# Patient Record
Sex: FEMALE | Race: WHITE
[De-identification: names, ages, dates, MRNs, and addresses within clinical notes are randomized per-mention and may not be internally consistent; named-entity substitution may affect disease eponyms.]

---

## 2017-09-06 NOTE — PCM.POSTAN
POST ANESTHESIA ASSESSMENT





- MENTAL STATUS


Mental Status: Alert, Oriented





- RESPIRATORY


Respiratory Status: Respiratory Rate WNL, Airway Patent





- CARDIOVASCULAR


CV Status: Pulse Rate WNL, Blood Pressure Stable





- GASTROINTESTINAL


GI Status: No Symptoms





- POST OP HYDRATION


Hydration Status: Adequate & Stable

## 2017-09-06 NOTE — PCM.PREANE
Preanesthetic Assessment





- Anesthesia/Transfusion/Family Hx


Anesthesia History: Prior Anesthesia Reaction


Type of Anesthesia Reaction: Excessive Nausea/Vomiting


Other Type of Anesthesia Reaction Comment: states she is hard to wake and has 

problems with nausea post anesthesia


Family History of Anesthesia Reaction: No


Transfusion History: No Prior Transfusion(s)





- Review of Systems


General: No Symptoms


Pulmonary: No Symptoms


Cardiovascular: No Symptoms





- Physical Assessment


NPO Status Date: 09/05/17


O2 Sat by Pulse Oximetry: 100


Respiratory Rate: 16


Vital Signs: 





 Last Vital Signs











Temp  36.2 C   09/06/17 09:55


 


Pulse  60   09/06/17 09:55


 


Resp  16   09/06/17 09:55


 


BP  145/62 H  09/06/17 09:55


 


Pulse Ox  100   09/06/17 09:55











Height: 1.65 m


Weight: 74.389 kg


ASA Class: 2


Mental Status: Alert & Oriented x3


Airway Class: Mallampati = 2


Dentition: Reports: Normal Dentition


ROM/Head Extension: Full


Lungs: Clear to Auscultation, Normal Respiratory Effort


Cardiovascular: Regular Rate, Regular Rhythm





- Allergies


Allergies/Adverse Reactions: 


 Allergies











Allergy/AdvReac Type Severity Reaction Status Date / Time


 


acetaminophen [From Vicodin] Allergy  Agitation Verified 09/05/17 11:15


 


hydrocodone bitartrate Allergy  Agitation Verified 09/05/17 11:15





[From Vicodin]     


 


latex Allergy  welts Verified 09/05/17 11:15














- Anesthesia Plan


Pre-Op Medication Ordered: None





- Acknowledgements


Anesthesia Type Planned: MAC


Pt an Appropriate Candidate for the Planned Anesthesia: Yes


Alternatives and Risks of Anesthesia Discussed w Pt/Guardian: Yes


Pt/Guardian Understands and Agrees with Anesthesia Plan: Yes





PreAnesthesia Questionnaire


HEENT History: Reports: Other (See Below)


Other HEENT History: wears glasses/contacts


Respiratory History: Reports: Asthma


Gastrointestinal History: Reports: Other (See Below)


Other Gastrointestinal History: occasional heartburn


Genitourinary History: Reports: None


OB/GYN History: Reports: Pregnancy


Neurological History: Reports: Migraines


Endocrine/Metabolic History: Reports: Hypothyroidism


Other Immunologic History: connective tissue disorder


Oncologic (Cancer) History: Reports: Basal Cell Carcinoma





- Past Surgical History


Head Surgeries/Procedures: Reports: None


GI Surgical History: Reports: Cholecystectomy


Female  Surgical History: Reports: Hysterectomy


Musculoskeletal Surgical History: Reports: Shoulder Surgery


Other Musculoskeletal Surgeries/Procedures:: rotator cuff repair


Dermatological Surgical History: Reports: Skin Biopsy





- SUBSTANCE USE


Smoking Status *Q: Never Smoker


Second Hand Smoke Exposure: No


Recreational Drug Use History: No





- HOME MEDS


Home Medications: 


 Home Meds





Albuterol Sulfate [Albuterol Sulfate HFA] 1 - 2 puff INH ASDIRECTED PRN 06/27/

15 [History]


Estradiol [Vivelle-Dot] 0.0375 mg TRDERM ASDIRECTED 06/27/15 [History]


Hydroxychloroquine Sulfate [Plaquenil] 2 tab PO DAILY 06/27/15 [History]


traMADol [Ultram] 2 tab PO BEDTIME 06/27/15 [History]


Cyanocobalamin (Vitamin B-12) [Cyanocobalamin Injection] 1 injection IM WEEKLY 

09/05/17 [History]


Diclofenac Sodium [Voltaren 1% Gel] 1 applic TOP ASDIRECTED PRN 09/05/17 [

History]


Estradiol [Estrace 0.01% Vaginal Crm] 1 applic VAG ASDIRECTED PRN 09/05/17 [

History]


Fluticasone Propionate [Flonase Allergy Relief] 1 spray NASBOTH ASDIRECTED PRN 

09/05/17 [History]


Levothyroxine [Synthroid] 50 mcg PO DAILY 09/05/17 [History]


Ondansetron [Zofran Odt] 8 mg SL ASDIRECTED PRN 09/05/17 [History]


valACYclovir HCl [Valtrex] 500 mg PO DAILY 09/05/17 [History]











- CURRENT (IN HOUSE) MEDS


Current Meds: 





 Current Medications





Lactated Ringer's (Ringers, Lactated)  1,000 mls @ 125 mls/hr IV ASDIRECTED KIRAN


   Last Admin: 09/06/17 09:57 Dose:  125 mls/hr


Sodium Chloride (Saline Flush)  10 ml FLUSH ASDIRECTED PRN


   PRN Reason: Keep Vein Open


Sodium Chloride (Saline Flush)  2.5 ml FLUSH ASDIRECTED PRN


   PRN Reason: Keep Vein Open





Discontinued Medications





Fentanyl (Sublimaze) Confirm Administered Dose 100 mcg .ROUTE .STK-MED ONE


   Stop: 09/06/17 09:53


Midazolam HCl (Versed 1 Mg/Ml) Confirm Administered Dose 2 mg .ROUTE .STK-MED 

ONE


   Stop: 09/06/17 09:53


Propofol (Diprivan  20 Ml) Confirm Administered Dose 200 mg .ROUTE [facility]MED [facility]


   Stop: 09/06/17 09:53

## 2017-09-06 NOTE — PCM.OPNOTE
- General Post-Op/Procedure Note


Date of Surgery/Procedure: 09/06/17


Operative Procedure(s): Diagnostic EGD and colonoscopy


Findings: 





Normal appearing UGI. Normal colon 


Pre Op Diagnosis: RUQ pain, change in bowel habits


Post-Op Diagnosis: same


Anesthesia Technique: MAC


Primary Surgeon: Cathryn Garrido


Condition: Good

## 2017-09-07 NOTE — OR
SURGEON:

NEDRA MENDES MD

 

DATE OF PROCEDURE:  09/06/2017

 

PREOPERATIVE DIAGNOSIS:

Right upper quadrant pain, change in bowel habits.

 

POSTOPERATIVE DIAGNOSIS:

Right upper quadrant pain, change in bowel habits.

 

PROCEDURE PERFORMED:

Diagnostic esophagogastroduodenoscopy and colonoscopy.

 

INSTRUMENT USED:

Olympus endoscope and colonoscope.

 

ANESTHESIA:

MAC.

 

EXTENT OF EXAM:

To the second portion of the duodenum, to the cecum.

 

PREPARATION:

Good.

 

LIMITATIONS:

None.

 

INDICATIONS FOR EXAMINATION:

The patient is a 57-year-old female, who presents with right upper quadrant pain

and a change in her bowel habits.  She has had a previous cholecystectomy and

has never had problems before this.  The decision was made to proceed with a

diagnostic EGD and colonoscopy to look for source of her discomfort.  We

discussed the procedures as well as expected perioperative course.  We discussed

the risks, including bleeding, infection, or damage to surrounding structures,

including perforation.  The patient verbalized understanding and wishes to

proceed.

 

PROCEDURE IN DETAIL:

The patient was brought into the endoscopy suite and placed in a beach chair

position.  A time-out was completed verifying the patient's name, age, date of

birth, allergies, and procedure to be performed.  Monitored anesthesia care was

induced and a bite block was placed in the patient's mouth.  Continuous oxygen

was provided via nasal cannula throughout the procedure.  After adequate

sedation was achieved, a well lubricated endoscope was placed into the patient's

mouth and advanced under direct visualization to the level of the second portion

of duodenum.  This appeared normal and a photograph was taken.  The scope was

then slowly withdrawn back while examining all the structures of the upper GI

tract.  The duodenum and its mucosa appeared normal.  A photograph was taken of

the pylorus as well as the scope retroflexed within the stomach.  The esophageal

hiatus and pylorus both appeared normal.  The gastric mucosa appeared normal

with no evidence of ulceration or inflammation.  Biopsies were taken of the

gastric, antrum, body, and fundus and sent to pathology for H. pylori testing.

The scope was then brought into the esophagus and the GE junction inspected.

This appeared normal.  The remainder of the esophageal mucosa appeared to be

free of any pathology.  The scope was removed from the patient and this portion

of the procedure was terminated.  The patient was placed in the left lateral

decubitus position and a digital rectal exam was performed.  This examination

was within normal limits.  The patient did have a couple small skin tags around

her anoderm.  A well lubricated colonoscope was then inserted into the rectum

and advanced under direct visualization to the level of the cecum.  The patient

was extremely tortuous making this difficult.  The cecum was identified by both

visual and anatomic landmarks.  A photograph was taken of the cecal cap.  I was

unable to retroflex the scope within the cecum due to significant looping more

proximally due to the tortuosity of the distal colon.  The scope was then fully

withdrawn while examining the color, texture, anatomy, integrity of the mucosa

from the cecum to the anal canal.  The findings were consistent with normal

colonic mucosa.  The scope was then brought into the rectum and retroflexed to

allow visualization of the anal canal opening which appeared normal.  A

photograph was taken of this.  The scope was then straightened out and fully

withdrawn from the patient.  Cecum to anus time was 9 minutes.  The procedure

was terminated and the patient was transferred to the recovery room in stable

condition.

 

ENDOSCOPIC DIAGNOSIS:

Normal EGD and colonoscopy.

 

RECOMMENDATION:

Follow up in clinic in 2 weeks.

 

 

KEVIN RAMÍREZ

DD:  09/07/2017 07:50:46

DT:  09/07/2017 11:53:54

Job #:  [id number]

## 2021-10-19 ENCOUNTER — HOSPITAL ENCOUNTER (OUTPATIENT)
Dept: HOSPITAL 56 - MW.SDS | Age: 61
Discharge: HOME | End: 2021-10-19
Attending: SURGERY
Payer: COMMERCIAL

## 2021-10-19 VITALS — HEART RATE: 59 BPM | SYSTOLIC BLOOD PRESSURE: 129 MMHG | DIASTOLIC BLOOD PRESSURE: 60 MMHG

## 2021-10-19 DIAGNOSIS — Z91.040: ICD-10-CM

## 2021-10-19 DIAGNOSIS — J45.909: ICD-10-CM

## 2021-10-19 DIAGNOSIS — Z88.8: ICD-10-CM

## 2021-10-19 DIAGNOSIS — M06.9: ICD-10-CM

## 2021-10-19 DIAGNOSIS — Z98.890: ICD-10-CM

## 2021-10-19 DIAGNOSIS — G43.109: ICD-10-CM

## 2021-10-19 DIAGNOSIS — K29.50: Primary | ICD-10-CM

## 2021-10-19 DIAGNOSIS — Z79.899: ICD-10-CM

## 2021-10-19 PROCEDURE — 88305 TISSUE EXAM BY PATHOLOGIST: CPT

## 2021-10-19 PROCEDURE — 88342 IMHCHEM/IMCYTCHM 1ST ANTB: CPT

## 2021-10-19 PROCEDURE — 43239 EGD BIOPSY SINGLE/MULTIPLE: CPT

## 2021-10-19 NOTE — [id number]
Post Anesthesia Note





- EVALUATION WITHIN 48HRS OF ANESTHETIC


Vital Signs in Normal Range: Yes


Patient Participated in Evaluation: Yes


Respiratory Function Stable: Yes


Airway Patent: Yes


Cardiovascular Function Stable: Yes


Hydration Status Stable: Yes


Pain Control Satisfactory: Yes


Nausea and Vomiting Control Satisfactory: Yes


Mental Status Recovered: Yes


Vital Signs: 


                                Last Vital Signs











Temp  96.6 F L  10/19/21 10:05


 


Pulse  61   10/19/21 10:05


 


Resp  15   10/19/21 10:05


 


BP  134/61   10/19/21 10:05


 


Pulse Ox  100   10/19/21 10:05

## 2021-10-19 NOTE — PCM.OPNOTE
- General Post-Op/Procedure Note


Date of Surgery/Procedure: 10/19/21


Operative Procedure(s): Diagnostic EGD


Findings: 





Gastritis of antrum 


Pre Op Diagnosis: Upper abdominal pain


Post-Op Diagnosis: gastritis


Anesthesia Technique: MAC


Primary Surgeon: Cathryn Garrido


Condition: Good


Free Text/Narrative:: 


                                 Intake & Output











 10/18/21 10/19/21 10/19/21





 22:59 06:59 14:59


 


Intake Total   700


 


Balance   700

## 2021-10-19 NOTE — PCM.PREANE
Preanesthetic Assessment





- Anesthesia/Transfusion/Family Hx


Anesthesia History: Prior Anesthesia Reaction


Other Type of Anesthesia Reaction Comment: states she is hard to wake and has 

problems with nausea post anesthesia


Transfusion History: No Prior Transfusion(s)





- Review of Systems


General: No Symptoms


Pulmonary: No Symptoms


Cardiovascular: No Symptoms


Gastrointestinal: No Symptoms


Neurological: No Symptoms


Other: Reports: None





- Physical Assessment


NPO Status Date: 10/19/21


NPO Status Time: 00:00


Vital Signs: 





                                Last Vital Signs











Temp  96.6 F L  10/19/21 10:05


 


Pulse  61   10/19/21 10:05


 


Resp  15   10/19/21 10:05


 


BP  134/61   10/19/21 10:05


 


Pulse Ox  100   10/19/21 10:05











Height: 5 ft 4 in


Weight: 161 lb


ASA Class: 3


Mental Status: Alert & Oriented x3


Airway Class: Mallampati = 2


Dentition: Reports: Normal Dentition, Crown(s)


Thyro-Mental Finger Breadths: 3


Mouth Opening Finger Breadths: 3


ROM/Head Extension: Full


Lungs: Clear to Auscultation, Normal Respiratory Effort


Cardiovascular: Regular Rate, Regular Rhythm





- Allergies


Allergies/Adverse Reactions: 


                                    Allergies











Allergy/AdvReac Type Severity Reaction Status Date / Time


 


acetaminophen [From Vicodin] Allergy  Agitation Verified 10/13/21 08:46


 


hydrocodone bitartrate Allergy  Agitation Verified 10/13/21 08:46





[From Vicodin]     


 


latex Allergy  welts Verified 10/13/21 09:02














- Acknowledgements


Anesthesia Type Planned: General Anesthesia


Pt an Appropriate Candidate for the Planned Anesthesia: Yes


Alternatives and Risks of Anesthesia Discussed w Pt/Guardian: Yes


Pt/Guardian Understands and Agrees with Anesthesia Plan: Yes





PreAnesthesia Questionnaire


HEENT History: Reports: Other (See Below)


Other HEENT History: wears glasses/contacts


Cardiovascular History: Reports: None


Respiratory History: Reports: Asthma


Gastrointestinal History: Reports: Other (See Below)


Other Gastrointestinal History: intermittent RUQ pain, peptic ulcer


Genitourinary History: Reports: None


OB/GYN History: Reports: Pregnancy


Musculoskeletal History: Reports: RA


Neurological History: Reports: Migraines


Psychiatric History: Reports: Anxiety


Endocrine/Metabolic History: Reports: None


Hematologic History: Reports: None


Immunologic History: Reports: Other (See Below)


Other Immunologic History: connective tissue disorder


Oncologic (Cancer) History: Reports: Basal Cell Carcinoma


Dermatologic History: Reports: None





- Past Surgical History


Head Surgeries/Procedures: Reports: None


HEENT Surgical History: Reports: None


Cardiovascular Surgical History: Reports: None


Respiratory Surgical History: Reports: None


GI Surgical History: Reports: Cholecystectomy, Colonoscopy


Female  Surgical History: Reports: Hysterectomy, Tubal Ligation


Endocrine Surgical History: Reports: None


Neurological Surgical History: Reports: None


Musculoskeletal Surgical History: Reports: Shoulder Surgery


Other Musculoskeletal Surgeries/Procedures:: rt rotator cuff repair


Oncologic Surgical History: Reports: None


Dermatological Surgical History: Reports: Skin Biopsy





- SUBSTANCE USE


Tobacco Use Status *Q: Never Tobacco User





- HOME MEDS


Home Medications: 


                                    Home Meds





Albuterol Sulfate [Albuterol Sulfate HFA] 1 - 2 puff INH ASDIRECTED PRN 06/27/15

[History]


Hydroxychloroquine Sulfate [Plaquenil] 200 mg PO BID 06/27/15 [History]


estradioL [Vivelle-Dot] 1 patch TRDERM ASDIRECTED 06/27/15 [History]


Cyanocobalamin (Vitamin B-12) [Cyanocobalamin Injection] 1 injection IM WEEKLY 

09/05/17 [History]


Diclofenac Sodium [Voltaren 1% Gel] 1 applic TOP ASDIRECTED PRN 09/05/17 [Histo

ry]


Fluticasone Propionate [Flonase Allergy Relief] 1 spray NASBOTH DAILY 09/05/17 

[History]


Ondansetron [Zofran Odt] 8 mg SL ASDIRECTED PRN 09/05/17 [History]


valACYclovir HCl [Valtrex] 500 mg PO DAILY 09/05/17 [History]


Ergocalciferol (Vitamin D2) [Vitamin D2] 50,000 units PO WEEKLY 10/13/21 

[History]


Metoclopramide HCl 10 mg PO Q8H PRN 10/13/21 [History]


Promethazine HCl 25 mg PO DAILY PRN 10/13/21 [History]


Rizatriptan Benzoate [Rizatriptan] 10 mg PO ASDIRECTED PRN 10/13/21 [History]


Ubrogepant [Ubrelvy] 50 mg PO ASDIRECTED PRN 10/13/21 [History]


diazePAM [Valium] 2 mg PO BID PRN 10/13/21 [History]











- CURRENT (IN HOUSE) MEDS


Current Meds: 





                               Current Medications





Lactated Ringer's (Ringers, Lactated)  1,000 mls @ 125 mls/hr IV ASDIRECTED KIRAN


   Last Admin: 10/19/21 10:09 Dose:  125 mls/hr


   Documented by: 


Sodium Chloride (Sodium Chloride 0.9% 10 Ml Syringe)  10 ml FLUSH ASDIRECTED PRN


   PRN Reason: Keep Vein Open


Sodium Chloride (Sodium Chloride 0.9% 2.5 Ml Syringe)  2.5 ml FLUSH ASDIRECTED 

PRN


   PRN Reason: Keep Vein Open


Sodium Chloride (Sodium Chloride 0.9% 10 Ml Syringe)  10 ml FLUSH ASDIRECTED PRN


   PRN Reason: Keep Vein Open


Sodium Chloride (Sodium Chloride 0.9% 2.5 Ml Syringe)  2.5 ml FLUSH ASDIRECTED 

PRN


   PRN Reason: Keep Vein Open


Sodium Chloride (Sodium Chloride 0.9% 10 Ml Sdv)  10 ml IV ASDIRECTED PRN


   PRN Reason: IV Use

## 2021-10-19 NOTE — PCM.POSTAN
POST ANESTHESIA ASSESSMENT





- MENTAL STATUS


Mental Status: Alert, Oriented





- VITAL SIGNS


Vital Signs: 


                                Last Vital Signs











Temp  96.6 F L  10/19/21 10:05


 


Pulse  61   10/19/21 10:05


 


Resp  15   10/19/21 10:05


 


BP  134/61   10/19/21 10:05


 


Pulse Ox  100   10/19/21 10:05














- RESPIRATORY


Respiratory Status: Respiratory Rate WNL, Airway Patent, O2 Saturation Stable





- CARDIOVASCULAR


CV Status: Pulse Rate WNL, Blood Pressure Stable





- GASTROINTESTINAL


GI Status: No Symptoms





- POST OP HYDRATION


Hydration Status: Adequate & Stable

## 2021-10-19 NOTE — OR
SURGEON:

CATHRYN GARRIDO MD

 

DATE OF PROCEDURE:  10/19/2021

 

PREOPERATIVE DIAGNOSIS:

Abdominal pain.

 

POSTOPERATIVE DIAGNOSIS:

Gastritis.

 

PROCEDURE PERFORMED:

Diagnostic esophagogastroduodenoscopy with biopsy.

 

PRIMARY SURGEON:

Cathryn Garrido MD

 

ANESTHESIA:

MAC.

 

INSTRUMENT USED:

Olympus endoscope.

 

EXTENT OF EXAM:

To the second portion of duodenum.

 

PREPARATION:

Good.

 

LIMITATIONS:

None.

 

INDICATIONS FOR EXAMINATION:

The patient is a 61-year-old female who presented to my clinic with abdominal

pain as well as a variety of upper GI symptoms.  The decision was made to

proceed with diagnostic EGD.  I explained the procedure, expected perioperative

course, and the risks.  She verbalized understanding and wishes to proceed.

 

PROCEDURE IN DETAIL:

The patient was brought in to the endoscopy suite and placed in a beach chair

position.  A time-out was completed verifying the patient's name, age, date of

birth, allergies, and procedure to be performed.  A bite block was placed in the

patient's mouth and continuous oxygen was provided via nasal cannula throughout

the procedure.  Monitored anesthesia care was induced and once adequate

anesthesia was achieved, a well-lubricated endoscope was placed in the patient's

mouth and advanced under direct visualization to the second portion of duodenum.

This appeared normal and a photograph was taken.  The scope was then fully

withdrawn while examining the color, texture, anatomy, and integrity of the

mucosa of the upper GI tract.  The duodenum appeared normal.  A biopsy was taken

of the duodenum and sent to Pathology for histologic review.  The scope was then

brought into the stomach and a photograph was taken of the pylorus and GE

junction.  Both appeared anatomically normal.  Biopsies were taken of the

gastric antrum, body, and fundus and sent for histologic review and H pylori

testing.  There appeared to be some inflammation in the antrum of the stomach.

The scope was then brought into the distal esophagus and a photograph was taken

of the Z-line.  This appeared normal.  A biopsy was taken in the distal

esophagus and sent to Pathology for histologic review.  The remainder of the

esophagus was normal.  The scope was removed and the procedure terminated.  The

patient tolerated the procedure well and was transferred to the PACU in stable

condition.

 

ENDOSCOPIC DIAGNOSIS:

Gastritis.

 

RECOMMENDATIONS:

I gave the patient pantoprazole and encouraged her to take it b.i.d. for a short

period of time.  We will follow up in clinic and discuss the results of her

biopsies.

 

 

KEVIN RAMÍREZ

DD:  10/19/2021 19:06:04

DT:  10/19/2021 22:32:40

Job #:  [id number]

## 2021-11-09 ENCOUNTER — HOSPITAL ENCOUNTER (EMERGENCY)
Dept: HOSPITAL 56 - MW.ED | Age: 61
Discharge: HOME | End: 2021-11-09
Payer: COMMERCIAL

## 2021-11-09 VITALS — SYSTOLIC BLOOD PRESSURE: 123 MMHG | HEART RATE: 78 BPM | DIASTOLIC BLOOD PRESSURE: 78 MMHG

## 2021-11-09 DIAGNOSIS — Z79.899: ICD-10-CM

## 2021-11-09 DIAGNOSIS — Z88.5: ICD-10-CM

## 2021-11-09 DIAGNOSIS — Z91.040: ICD-10-CM

## 2021-11-09 DIAGNOSIS — R51.9: Primary | ICD-10-CM

## 2021-11-09 PROCEDURE — 70450 CT HEAD/BRAIN W/O DYE: CPT

## 2021-11-09 PROCEDURE — 96375 TX/PRO/DX INJ NEW DRUG ADDON: CPT

## 2021-11-09 PROCEDURE — 99284 EMERGENCY DEPT VISIT MOD MDM: CPT

## 2021-11-09 PROCEDURE — 96374 THER/PROPH/DIAG INJ IV PUSH: CPT

## 2021-11-09 NOTE — EDM.PDOC
ED HPI GENERAL MEDICAL PROBLEM





- General


Chief Complaint: Headache


Stated Complaint: MIGRAINE


Time Seen by Provider: 11/09/21 17:27





- History of Present Illness


INITIAL COMMENTS - FREE TEXT/NARRATIVE: 





Patient presents complaining of headache.  Patient has a longstanding history of

migraine headaches.  She states her current headache has been going on for about

1 month in duration.  About a week to week and a half ago there was a change in 

character of her headache that is it was more on the left frontal side which is 

specific and not typical of her normal migraines.  Furthermore normally she is 

able to break the cycle of her migraines with a Toradol shot but this was 

unsuccessful in the physician visit that she had recently.  Patient does have 

some nausea with her migraines but she states the nausea is more now.  Patient 

has had no fevers.  She was treated for a sinus infection over the last week or 

so.  No slurred speech or double vision or arm or specific leg 

weakness/paralysis.  Patient is vaccinated against Covid.  This is not the worst

headache of her life.


  ** Head


Pain Score (Numeric/FACES): 8





- Related Data


                                    Allergies











Allergy/AdvReac Type Severity Reaction Status Date / Time


 


acetaminophen [From Vicodin] Allergy  Agitation Verified 11/09/21 17:21


 


hydrocodone bitartrate Allergy  Agitation Verified 11/09/21 17:21





[From Vicodin]     


 


latex Allergy  welts Verified 11/09/21 17:21











Home Meds: 


                                    Home Meds





Albuterol Sulfate [Albuterol Sulfate HFA] 1 - 2 puff INH ASDIRECTED PRN 06/27/15

[History]


Hydroxychloroquine Sulfate [Plaquenil] 200 mg PO BID 06/27/15 [History]


estradioL [Vivelle-Dot] 1 patch TRDERM ASDIRECTED 06/27/15 [History]


Cyanocobalamin (Vitamin B-12) [Cyanocobalamin Injection] 1 injection IM WEEKLY 

09/05/17 [History]


Diclofenac Sodium [Voltaren 1% Gel] 1 applic TOP ASDIRECTED PRN 09/05/17 

[History]


Fluticasone Propionate [Flonase Allergy Relief] 1 spray NASBOTH DAILY 09/05/17 

[History]


valACYclovir HCl [Valtrex] 500 mg PO DAILY 09/05/17 [History]


Ergocalciferol (Vitamin D2) [Vitamin D2] 50,000 units PO WEEKLY 10/13/21 

[History]


Rizatriptan Benzoate [Rizatriptan] 10 mg PO ASDIRECTED PRN 10/13/21 [History]


Ubrogepant [Ubrelvy] 50 mg PO ASDIRECTED PRN 10/13/21 [History]


diazePAM [Valium] 2 mg PO BID PRN 10/13/21 [History]











Past Medical History


HEENT History: Reports: Other (See Below)


Other HEENT History: wears glasses/contacts


Cardiovascular History: Reports: None


Respiratory History: Reports: Asthma


Gastrointestinal History: Reports: Other (See Below)


Other Gastrointestinal History: intermittent RUQ pain, peptic ulcer


Genitourinary History: Reports: None


OB/GYN History: Reports: Pregnancy


Musculoskeletal History: Reports: RA


Neurological History: Reports: Migraines


Psychiatric History: Reports: Anxiety


Endocrine/Metabolic History: Reports: None


Hematologic History: Reports: None


Immunologic History: Reports: Other (See Below)


Other Immunologic History: connective tissue disorder


Oncologic (Cancer) History: Reports: Basal Cell Carcinoma


Dermatologic History: Reports: None





- Past Surgical History


Head Surgeries/Procedures: Reports: None


HEENT Surgical History: Reports: None


Cardiovascular Surgical History: Reports: None


Respiratory Surgical History: Reports: None


GI Surgical History: Reports: Cholecystectomy, Colonoscopy


Female  Surgical History: Reports: Hysterectomy, Tubal Ligation


Endocrine Surgical History: Reports: None


Neurological Surgical History: Reports: None


Musculoskeletal Surgical History: Reports: Shoulder Surgery


Other Musculoskeletal Surgeries/Procedures:: rt rotator cuff repair


Oncologic Surgical History: Reports: None


Dermatological Surgical History: Reports: Skin Biopsy





Social & Family History





- Family History


Family Medical History: No Pertinent Family History





- Tobacco Use


Second Hand Smoke Exposure: No





- Caffeine Use


Caffeine Use: Reports: None





- Recreational Drug Use


Recreational Drug Use: No





ED ROS GENERAL





- Review of Systems


Review Of Systems: Comprehensive ROS is negative, except as noted in HPI.





ED EXAM, GENERAL





- Physical Exam


Exam: See Below


Free Text/Narrative:: 





CONSTITUTIONAL: well appearing in no acute distress


SKIN: Warm, dry, and intact without rash


HENT: Normocephalic, atraumatic,


PULMONARY: clear to ausculation bilaterally. No rales, rhonchi, wheezing


CARDIOVASCULAR: regular rate, No murmur, rubs, or gallops


GASTROINTESTINAL: soft, nondistended, nontender


NEUROLOGIC: normal speech, II-XII intact. light touch/5/5 power equal and 

symmetric in upper and lower extremities without deficit


MUSCULOSKELETAL: no gross deformities, atraumatic


PSYCHIATRIC: normal mood and affect





Course





- Vital Signs


Text/Narrative:: 





Differential diagnosis: Migraine headache, tension headache, CNS mass, 

subarachnoid hemorrhage, infection, other





Patient presents as outlined above.  The patient did have somewhat of a change 

in character of headache so CT scan was done which is unremarkable for any acute

 findings.  It was not sudden onset worst headache of the life 2 warrant 

additional studies to rule out subarachnoid hemorrhage.  Patient's neurologic 

exam is nonfocal.  She is feeling somewhat better after medications here in the 

emergency department.  Supportive treatment return precautions and PCP follow-

up.


Last Recorded V/S: 


                                Last Vital Signs











Temp  35.9 C L  11/09/21 17:18


 


Pulse  78   11/09/21 19:06


 


Resp  20   11/09/21 19:06


 


BP  123/78   11/09/21 19:06


 


Pulse Ox  99   11/09/21 19:06














- Orders/Labs/Meds


Meds: 


Medications














Discontinued Medications














Generic Name Dose Route Start Last Admin





  Trade Name Martha  PRN Reason Stop Dose Admin


 


Sodium Chloride  1,000 mls @ 999 mls/hr  11/09/21 17:37  11/09/21 17:44





  Normal Saline  IV  11/09/21 18:37  999 mls/hr





  .BOLUS ONE   Administration


 


Ketorolac Tromethamine  30 mg  11/09/21 18:30  11/09/21 18:43





  Ketorolac 30 Mg/Ml Sdv  IVPUSH  11/09/21 18:31  30 mg





  ONETIME ONE   Administration


 


Metoclopramide HCl  10 mg  11/09/21 17:37  11/09/21 17:44





  Metoclopramide 10 Mg/2 Ml Sdv  IVPUSH  11/09/21 17:38  10 mg





  ONETIME ONE   Administration














Departure





- Departure


Time of Disposition: 18:57


Disposition: Home, Self-Care 01


Condition: Good


Clinical Impression: 


 Headache








- Discharge Information


Instructions:  General Headache Without Cause


Referrals: 


Andry Owens MD [Primary Care Provider] - 


Forms:  ED Department Discharge


Additional Instructions: 


Return for slurred speech, double vision, arm/leg numbness/weakness, fever, 

change or worsening condition.  Follow-up with primary care doctor this week.  

Consider following up with a neurologist if your headache continues.

















--------------------------------------------------------

----------------------------------





The following information is given to patients seen in the emergency department 

who are being discharged to home. This information is to outline your options 

for follow-up care. We provide all patients seen in our emergency department 

with a follow-up referral.





The need for follow-up, as well as the timing and circumstances, are variable 

depending upon the specifics of your emergency department visit.





If you don't have a primary care physician on staff, we will provide you with a 

referral. We always advise you to contact your personal physician following an 

emergency department visit to inform them of the circumstance of the visit and 

for follow-up with them and/or the need for any referrals to a consulting 

specialist.





The emergency department will also refer you to a specialist when appropriate. 

This referral assures that you have the opportunity for follow-up care with a 

specialist. All of these measure are taken in an effort to provide you with 

optimal care, which includes your follow-up.











Primary care clinics in the area:





[facility] - Primary Care


[address] [place] [address]


Phone: [phone]


Fax: [phone]








[place] [address]


Phone: [phone]


Fax: [phone]





Under all circumstances we always encourage you to contact your private 

physician who remains a resource for coordinating your care. When calling for 

follow-up care, please make the office aware that this follow-up is from your 

recent emergency room visit. If for any reason you are refused follow-up, please

contact the [facility] Emergency Department

at [phone] and asked to speak to the emergency department charge nurse.








Sepsis Event Note (ED)





- Evaluation


Sepsis Screening Result: No Definite Risk

## 2021-11-09 NOTE — CT
INDICATION:



Headache. Migraine.



TECHNIQUE:



Noncontrast CT images acquired through the brain.



COMPARISON:



None.



FINDINGS:



The ventricles and sulci are within normal limits for patient age. No mass 

effect or midline shift. The gray-white differentiation is maintained.



No acute intracranial hemorrhage or pathologic extra-axial fluid 

collection. Minimal atherosclerotic calcifications in the right carotid 

siphon.



The globes are symmetric. The calvarium is intact. The paranasal sinuses 

and mastoid air cells are clear.



IMPRESSION:



No acute intracranial hemorrhage or mass effect.



Please note that all CT scans at this facility use dose modulation, 

iterative reconstruction, and/or weight-based dosing when appropriate to 

reduce radiation dose to as low as reasonably achievable.



Dictated by Jamaal Argueta MD @ 11/9/2021 6:09:41 PM



(Electronically Signed)

## 2022-12-06 ENCOUNTER — HOSPITAL ENCOUNTER (OUTPATIENT)
Dept: HOSPITAL 56 - MW.SDS | Age: 62
Discharge: HOME | End: 2022-12-06
Attending: OBSTETRICS & GYNECOLOGY
Payer: COMMERCIAL

## 2022-12-06 VITALS — DIASTOLIC BLOOD PRESSURE: 65 MMHG | HEART RATE: 54 BPM | SYSTOLIC BLOOD PRESSURE: 144 MMHG

## 2022-12-06 DIAGNOSIS — Z88.6: ICD-10-CM

## 2022-12-06 DIAGNOSIS — Z90.49: ICD-10-CM

## 2022-12-06 DIAGNOSIS — Z98.890: ICD-10-CM

## 2022-12-06 DIAGNOSIS — Z90.710: ICD-10-CM

## 2022-12-06 DIAGNOSIS — F41.9: ICD-10-CM

## 2022-12-06 DIAGNOSIS — G43.909: ICD-10-CM

## 2022-12-06 DIAGNOSIS — R10.2: Primary | ICD-10-CM

## 2022-12-06 DIAGNOSIS — Z79.899: ICD-10-CM

## 2022-12-06 DIAGNOSIS — J45.909: ICD-10-CM

## 2022-12-06 PROCEDURE — 56810 PERINEOPLASTY RPR PER NONOB: CPT

## 2025-01-24 ENCOUNTER — HOSPITAL ENCOUNTER (EMERGENCY)
Dept: HOSPITAL 56 - MW.ED | Age: 65
Discharge: HOME | End: 2025-01-24
Payer: COMMERCIAL

## 2025-01-24 VITALS — HEART RATE: 58 BPM | SYSTOLIC BLOOD PRESSURE: 168 MMHG | DIASTOLIC BLOOD PRESSURE: 58 MMHG

## 2025-01-24 DIAGNOSIS — Z88.5: ICD-10-CM

## 2025-01-24 DIAGNOSIS — Z90.49: ICD-10-CM

## 2025-01-24 DIAGNOSIS — R10.32: Primary | ICD-10-CM

## 2025-01-24 DIAGNOSIS — Z91.040: ICD-10-CM

## 2025-01-24 DIAGNOSIS — R79.89: ICD-10-CM

## 2025-01-24 DIAGNOSIS — Z88.6: ICD-10-CM

## 2025-01-24 DIAGNOSIS — R03.0: ICD-10-CM

## 2025-01-24 DIAGNOSIS — Z79.51: ICD-10-CM

## 2025-01-24 DIAGNOSIS — Z79.899: ICD-10-CM

## 2025-01-24 DIAGNOSIS — J45.909: ICD-10-CM

## 2025-01-24 DIAGNOSIS — Z88.8: ICD-10-CM

## 2025-01-24 LAB
ALBUMIN SERPL-MCNC: 3.9 G/DL (ref 3.4–5)
ALBUMIN/GLOB SERPL: 1.1 {RATIO} (ref 0.9–1.6)
ALP SERPL-CCNC: 79 U/L (ref 46–116)
ALT SERPL-CCNC: 31 IU/L (ref 14–63)
APPEARANCE UR: (no result)
AST SERPL-CCNC: 23 IU/L (ref 15–37)
BASOPHILS # BLD AUTO: 0.03 K/UL (ref 0–0.2)
BASOPHILS NFR BLD AUTO: 0.4 % (ref 0–1)
BILIRUB SERPL-MCNC: 0.4 MG/DL (ref 0.2–1)
BILIRUB UR STRIP-MCNC: NEGATIVE MG/DL
BUN SERPL-MCNC: 7 MG/DL (ref 7–18)
CALCIUM SERPL-MCNC: 8.9 MG/DL (ref 8.5–10.1)
CHLORIDE SERPL-SCNC: 101 MMOL/L (ref 98–107)
CO2 SERPL-SCNC: 27.1 MMOL/L (ref 21–32)
COLOR UR: YELLOW
CREAT CL 24H UR+SERPL-VRATE: 43.18 ML/MIN
CREAT SERPL-MCNC: 1.2 MG/DL (ref 0.6–1)
CRP SERPL-MCNC: 0.18 MG/DL (ref ?–0.3)
EGFRCR SERPLBLD CKD-EPI 2021: 51 ML/MIN (ref 60–?)
EOSINOPHIL # BLD AUTO: 0.11 K/UL (ref 0–0.45)
EOSINOPHIL NFR BLD AUTO: 1.6 % (ref 0–6)
GLOBULIN SER-MCNC: 3.6 G/DL (ref 2.6–4)
GLUCOSE SERPL-MCNC: 96 MG/DL (ref 74–106)
GLUCOSE UR STRIP-MCNC: NEGATIVE MG/DL
HCT VFR BLD AUTO: 39 % (ref 37–47)
HGB BLD-MCNC: 13.2 G/DL (ref 12–16)
IMM GRANULOCYTES # BLD: 0.04 K/UL (ref 0–0.05)
IMM GRANULOCYTES NFR BLD: 0.6 % (ref 0–0.4)
KETONES UR STRIP-MCNC: NEGATIVE MG/DL
LIPASE SERPL-CCNC: 26 U/L (ref 16–77)
LYMPHOCYTES # BLD AUTO: 2.56 K/UL (ref 1–4.8)
LYMPHOCYTES NFR BLD AUTO: 36.1 % (ref 24–44)
MCH RBC QN AUTO: 30.3 PG (ref 28–32)
MCHC RBC AUTO-ENTMCNC: 33.8 G/DL (ref 32–36)
MCHC RBC AUTO-ENTMCNC: 89.4 FL (ref 83–99)
MONOCYTES # BLD AUTO: 0.45 K/UL (ref 0–0.8)
MONOCYTES NFR BLD AUTO: 6.3 % (ref 0–8)
NEUTROPHILS # BLD AUTO: 3.9 K/UL (ref 1.8–7.7)
NEUTROPHILS NFR BLD AUTO: 55 % (ref 41–71)
NITRITE UR QL: NEGATIVE
NRBC BLD AUTO-RTO: 0 /100WBC (ref 0–0.2)
NRBC BLD AUTO-RTO: 0 K/UL (ref 0–0.02)
PH UR STRIP: 6 [PH] (ref 5–8)
PLATELET # BLD AUTO: 252 K/UL (ref 150–400)
PMV BLD AUTO: 11.1 FL (ref 9.4–12.3)
POTASSIUM SERPL-SCNC: 3.9 MMOL/L (ref 3.5–5.1)
PROT SERPL-MCNC: 7.5 G/DL (ref 6.4–8.2)
PROT UR STRIP-MCNC: NEGATIVE MG/DL
RBC # BLD AUTO: 4.36 M/UL (ref 4.1–5.3)
RBC UR QL: NEGATIVE
SODIUM SERPL-SCNC: 137 MMOL/L (ref 136–145)
SP GR UR STRIP: 1.01 (ref 1–1.03)
UROBILINOGEN UR STRIP-ACNC: 0.2 EU/DL (ref ?–2)
WBC # BLD AUTO: 7.09 K/UL (ref 3.9–11.3)

## 2025-01-24 PROCEDURE — 80053 COMPREHEN METABOLIC PANEL: CPT

## 2025-01-24 PROCEDURE — 96375 TX/PRO/DX INJ NEW DRUG ADDON: CPT

## 2025-01-24 PROCEDURE — 86140 C-REACTIVE PROTEIN: CPT

## 2025-01-24 PROCEDURE — 96374 THER/PROPH/DIAG INJ IV PUSH: CPT

## 2025-01-24 PROCEDURE — 83690 ASSAY OF LIPASE: CPT

## 2025-01-24 PROCEDURE — 74177 CT ABD & PELVIS W/CONTRAST: CPT

## 2025-01-24 PROCEDURE — 83605 ASSAY OF LACTIC ACID: CPT

## 2025-01-24 PROCEDURE — 85652 RBC SED RATE AUTOMATED: CPT

## 2025-01-24 PROCEDURE — 81003 URINALYSIS AUTO W/O SCOPE: CPT

## 2025-01-24 PROCEDURE — 85025 COMPLETE CBC W/AUTO DIFF WBC: CPT

## 2025-01-24 PROCEDURE — 36415 COLL VENOUS BLD VENIPUNCTURE: CPT

## 2025-01-24 PROCEDURE — 96361 HYDRATE IV INFUSION ADD-ON: CPT

## 2025-01-24 PROCEDURE — 99284 EMERGENCY DEPT VISIT MOD MDM: CPT

## 2025-01-24 RX ADMIN — IOPAMIDOL STA ML: 755 INJECTION, SOLUTION INTRAVENOUS at 12:05

## 2025-01-24 RX ADMIN — ONDANSETRON ONE MG: 2 INJECTION, SOLUTION INTRAMUSCULAR; INTRAVENOUS at 09:57

## 2025-01-24 RX ADMIN — Medication PRN ML: at 09:46

## 2025-04-18 ENCOUNTER — HOSPITAL ENCOUNTER (EMERGENCY)
Dept: HOSPITAL 56 - MW.ED | Age: 65
Discharge: HOME | End: 2025-04-18
Payer: COMMERCIAL

## 2025-04-18 VITALS — SYSTOLIC BLOOD PRESSURE: 133 MMHG | DIASTOLIC BLOOD PRESSURE: 44 MMHG | HEART RATE: 63 BPM

## 2025-04-18 DIAGNOSIS — Z90.710: ICD-10-CM

## 2025-04-18 DIAGNOSIS — E78.00: ICD-10-CM

## 2025-04-18 DIAGNOSIS — Z79.899: ICD-10-CM

## 2025-04-18 DIAGNOSIS — E04.1: ICD-10-CM

## 2025-04-18 DIAGNOSIS — Z88.8: ICD-10-CM

## 2025-04-18 DIAGNOSIS — Z90.49: ICD-10-CM

## 2025-04-18 DIAGNOSIS — R42: Primary | ICD-10-CM

## 2025-04-18 DIAGNOSIS — I10: ICD-10-CM

## 2025-04-18 DIAGNOSIS — Z91.040: ICD-10-CM

## 2025-04-18 DIAGNOSIS — Z75.3: ICD-10-CM

## 2025-04-18 LAB
ALBUMIN SERPL-MCNC: 3.9 G/DL (ref 3.4–5)
ALBUMIN/GLOB SERPL: 1.1 {RATIO} (ref 0.9–1.6)
BASOPHILS # BLD AUTO: 0.02 K/UL (ref 0–0.2)
BASOPHILS NFR BLD AUTO: 0.3 % (ref 0–1)
BILIRUB SERPL-MCNC: 0.4 MG/DL (ref 0.2–1)
CALCIUM SERPL-MCNC: 9.1 MG/DL (ref 8.5–10.1)
CO2 SERPL-SCNC: 26.8 MMOL/L (ref 21–32)
CREAT CL 24H UR+SERPL-VRATE: 39.86 ML/MIN
CREAT SERPL-MCNC: 1.3 MG/DL (ref 0.6–1)
EOSINOPHIL # BLD AUTO: 0.06 K/UL (ref 0–0.45)
GLOBULIN SER-MCNC: 3.4 G/DL (ref 2.6–4)
HCT VFR BLD AUTO: 40.5 % (ref 37–47)
HGB BLD-MCNC: 13.8 G/DL (ref 12–16)
IMM GRANULOCYTES # BLD: 0.03 K/UL (ref 0–0.05)
IMM GRANULOCYTES NFR BLD: 0.5 % (ref 0–0.4)
LYMPHOCYTES # BLD AUTO: 1.56 K/UL (ref 1–4.8)
LYMPHOCYTES NFR BLD AUTO: 25.1 % (ref 24–44)
MCH RBC QN AUTO: 30.1 PG (ref 28–32)
MCHC RBC AUTO-ENTMCNC: 34.1 G/DL (ref 32–36)
MCHC RBC AUTO-ENTMCNC: 88.4 FL (ref 83–99)
MONOCYTES # BLD AUTO: 0.39 K/UL (ref 0–0.8)
MONOCYTES NFR BLD AUTO: 6.3 % (ref 0–8)
NEUTROPHILS # BLD AUTO: 4.16 K/UL (ref 1.8–7.7)
NEUTROPHILS NFR BLD AUTO: 66.8 % (ref 41–71)
PLATELET # BLD AUTO: 256 K/UL (ref 150–400)
PMV BLD AUTO: 10.7 FL (ref 9.4–12.3)
POTASSIUM SERPL-SCNC: 4.4 MMOL/L (ref 3.5–5.1)
PROT SERPL-MCNC: 7.3 G/DL (ref 6.4–8.2)
RBC # BLD AUTO: 4.58 M/UL (ref 4.1–5.3)
WBC # BLD AUTO: 6.22 K/UL (ref 3.9–11.3)

## 2025-04-18 PROCEDURE — 70450 CT HEAD/BRAIN W/O DYE: CPT

## 2025-04-18 PROCEDURE — 96375 TX/PRO/DX INJ NEW DRUG ADDON: CPT

## 2025-04-18 PROCEDURE — 96374 THER/PROPH/DIAG INJ IV PUSH: CPT

## 2025-04-18 PROCEDURE — 70498 CT ANGIOGRAPHY NECK: CPT

## 2025-04-18 PROCEDURE — 96361 HYDRATE IV INFUSION ADD-ON: CPT

## 2025-04-18 PROCEDURE — 70496 CT ANGIOGRAPHY HEAD: CPT

## 2025-04-18 PROCEDURE — 36415 COLL VENOUS BLD VENIPUNCTURE: CPT

## 2025-04-18 PROCEDURE — 83735 ASSAY OF MAGNESIUM: CPT

## 2025-04-18 PROCEDURE — 96376 TX/PRO/DX INJ SAME DRUG ADON: CPT

## 2025-04-18 PROCEDURE — 80053 COMPREHEN METABOLIC PANEL: CPT

## 2025-04-18 PROCEDURE — 99284 EMERGENCY DEPT VISIT MOD MDM: CPT

## 2025-04-18 PROCEDURE — 93005 ELECTROCARDIOGRAM TRACING: CPT

## 2025-04-18 PROCEDURE — 85025 COMPLETE CBC W/AUTO DIFF WBC: CPT

## 2025-04-18 RX ADMIN — ONDANSETRON ONE MG: 2 INJECTION, SOLUTION INTRAMUSCULAR; INTRAVENOUS at 10:42

## 2025-04-18 RX ADMIN — IOPAMIDOL STA ML: 755 INJECTION, SOLUTION INTRAVENOUS at 11:39
